# Patient Record
Sex: FEMALE | Race: WHITE
[De-identification: names, ages, dates, MRNs, and addresses within clinical notes are randomized per-mention and may not be internally consistent; named-entity substitution may affect disease eponyms.]

---
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
## 2018-02-07 NOTE — EDM.PDOC
ED HPI GENERAL MEDICAL PROBLEM





- General


Chief Complaint: Lower Extremity Injury/Pain


Stated Complaint: R FOOT INJURY


Time Seen by Provider: 18 18:00


Source of Information: Reports: Patient, RN


History Limitations: Reports: No Limitations





- History of Present Illness


INITIAL COMMENTS - FREE TEXT/NARRATIVE: 





33 yo female fell on some stairs this morning and injured her R foot. Is barely 

able to bear weight on this foot. Has bruising dorsally. No other areas of pain.


Onset: Today


Onset Date: 18


Onset Time: 08:30


Duration: Hour(s):


Location: Reports: Lower Extremity, Right


Quality: Reports: Dull, Other (sharp with weight bearing)


Severity: Moderate


Improves with: Reports: Rest


Worsens with: Reports: Movement


Context: Reports: Trauma


Associated Symptoms: Reports: No Other Symptoms


Treatments PTA: Reports: Other (see below) (none)


  ** Right Feet


Pain Score (Numeric/FACES): 4





- Related Data


 Allergies











Allergy/AdvReac Type Severity Reaction Status Date / Time


 


No Known Allergies Allergy   Verified 17 21:48











Home Meds: 


 Home Meds





Diazepam [Valium] 2 mg PO ASDIRECTED PRN 17 [History]


FLUoxetine [PROzac] 20 mg PO DAILY 17 [History]


Levothyroxine 25 mcg PO DAILY 17 [History]











Past Medical History


Respiratory History: Reports: Asthma, Bronchitis, Recurrent


OB/GYN History: Reports: Pregnancy


Musculoskeletal History: Reports: Other (See Below)


Other Musculoskeletal History: C/0 R foot pain


Neurological History: Reports: Concussion


Psychiatric History: Reports: ADHD, Addiction, Anxiety, Panic Attack, Other (

See Below)


Other Psychiatric History: addicted to alcohol and recreational drugs


Endocrine/Metabolic History: Reports: Hyperthyroidism





- Past Surgical History


Female  Surgical History: Reports:  Section





Social & Family History





- Tobacco Use


Smoking Status *Q: Current Every Day Smoker


Years of Tobacco use: 10


Packs/Tins Daily: 5





- Caffeine Use


Caffeine Use: Reports: Coffee





- Recreational Drug Use


Recreational Drug Use: No


Drug Use in Last 12 Months: Yes


Recreational Drug Type: Reports: Marijuana/Hashish


Recreational Drug Use Frequency: Rarely





Review of Systems





- Review of Systems


Review Of Systems: See Below


Constitutional: Reports: No Symptoms


Musculoskeletal: Reports: Foot Pain (right)


Skin: Reports: Bruising (dorsum R foot)


Neurological: Reports: No Symptoms





ED EXAM, GENERAL





- Physical Exam


Exam: See Below


Exam Limited By: No Limitations


General Appearance: Alert, WD/WN, No Apparent Distress


Eye Exam: Bilateral Eye: Normal Inspection


Ears: Normal External Exam, Normal Canal, Hearing Grossly Normal


Ear Exam: Bilateral Ear: Auricle Normal, Canal Normal


Nose: Normal Inspection, Normal Mucosa, No Blood


Throat/Mouth: Normal Voice, No Airway Compromise


Head: Atraumatic, Normocephalic


Neck: Normal Inspection


Respiratory/Chest: No Respiratory Distress, No Accessory Muscle Use


Cardiovascular: Regular Rate, Rhythm, No Edema


Extremities: Other (R foot pain)


Neurological: Alert, Oriented, CN II-XII Intact, Normal Cognition, No Motor/

Sensory Deficits


Psychiatric: Normal Affect, Normal Mood


Skin Exam: Warm, Dry, Intact, Ecchymosis (dorsum of the R foot)





Course





- Vital Signs


Last Recorded V/S: 


 Last Vital Signs











Temp  36.9 C   18 17:45


 


Pulse  70   18 17:45


 


Resp  18   18 17:45


 


BP  126/72   18 17:45


 


Pulse Ox  100   18 17:43














- Orders/Labs/Meds


Orders: 


 Active Orders 24 hr











 Category Date Time Status


 


 Foot Comp Min 3V Rt [CR] Stat Exams  18 18:05 Ordered














- Radiology Interpretation


Free Text/Narrative:: 





R foot X-ray-negative





Departure





- Departure


Time of Disposition: 18:44


Disposition: Home, Self-Care 01


Condition: Good


Clinical Impression: 


Contusion, foot


Qualifiers:


 Encounter type: initial encounter Laterality: right Qualified Code(s): 

S90.31XA - Contusion of right foot, initial encounter








- Discharge Information


Referrals: 


Franklin Henriquez MD [Primary Care Provider] - 


Forms:  ED Department Discharge





- My Orders


Last 24 Hours: 


My Active Orders





18 18:05


Foot Comp Min 3V Rt [CR] Stat 














- Assessment/Plan


Last 24 Hours: 


My Active Orders





18 18:05


Foot Comp Min 3V Rt [CR] Stat

## 2018-02-08 NOTE — CR
No definitive acute fracture. Slight irregularity at the fourth metatarsal neck which may be degenera
tive. There is point tenderness this location recommend x-ray follow-up in 7-10 days.

## 2018-02-21 NOTE — EDM.PDOC
ED HPI GENERAL MEDICAL PROBLEM





- General


Chief Complaint: Neck Problem


Stated Complaint: HEAD/NECK PAIN


Time Seen by Provider: 18 19:05


Source of Information: Reports: Patient, RN Notes Reviewed


History Limitations: Reports: No Limitations





- History of Present Illness


INITIAL COMMENTS - FREE TEXT/NARRATIVE: 





32-year-old female presents to the emergency department today complaint of neck 

pain and jaw pain, she states she's had this on and off for several months 

however over the last couple days it has progressively gotten worse. Denies any 

fevers no particular trauma does admit to having poor dentition and she is 

working with the dentist.


  ** Head


Pain Score (Numeric/FACES): 6





- Related Data


 Allergies











Allergy/AdvReac Type Severity Reaction Status Date / Time


 


No Known Allergies Allergy   Verified 17 21:48











Home Meds: 


 Home Meds





Diazepam [Valium] 2 mg PO ASDIRECTED PRN 17 [History]


FLUoxetine [PROzac] 20 mg PO DAILY 17 [History]


Levothyroxine 25 mcg PO DAILY 17 [History]











Past Medical History


HEENT History: Reports: Other (See Below)


Other HEENT History: TMJ


Respiratory History: Reports: Asthma, Bronchitis, Recurrent


OB/GYN History: Reports: Pregnancy


Musculoskeletal History: Reports: Other (See Below)


Other Musculoskeletal History: C/0 R foot paintorn tendon R ankle


Neurological History: Reports: Concussion


Psychiatric History: Reports: ADHD, Addiction, Anxiety, Panic Attack, Other (

See Below)


Other Psychiatric History: addicted to alcohol and recreational drugs


Endocrine/Metabolic History: Reports: Hyperthyroidism





- Past Surgical History


Female  Surgical History: Reports:  Section





Social & Family History





- Tobacco Use


Smoking Status *Q: Current Every Day Smoker


Years of Tobacco use: 16


Packs/Tins Daily: 0.5





- Caffeine Use


Caffeine Use: Reports: Coffee, Soda





- Alcohol Use


Days Per Week of Alcohol Use: 1


Number of Drinks Per Day: 4


Total Drinks Per Week: 4


Date of Last Drink: 18


Time of Last Drink: 17:00





- Recreational Drug Use


Recreational Drug Use: Yes


Drug Use in Last 12 Months: No


Recreational Drug Type: Reports: Methamphetamine


Recreational Drug Use Frequency: Rarely





ED ROS ENT





- Review of Systems


Review Of Systems: See Below


Constitutional: Denies: Fever, Chills


HEENT: Reports: Dental Pain


Respiratory: Reports: No Symptoms


Cardiovascular: Reports: No Symptoms


GI/Abdominal: Reports: No Symptoms


: Reports: No Symptoms


Musculoskeletal: Reports: Neck Pain


Skin: Reports: No Symptoms


Neurological: Reports: No Symptoms





ED EXAM, ENT





- Physical Exam


Exam: See Below


Text/Narrative:: 





General: Female mild distress secondary to pain, tearful, anxious, alert and 

oriented x3 HEENT: head is atraumatic normocephalic, eyes pupils equal round 

reactive to light, sclera clear no conjunctivitis appreciated.  Ears tympanic 

membranes clear and gray landmarks and light reflex are present bilaterally 

canals are clear.  Nose no septal deviation, nares are clear, no blood present.

  Mouth mucosa is moist and pink no erythema or exudate noted in soft palate, 

tongue is midline uvula is midline, dentition is poor, multiple missing teeth 

multiple fractured teeth tenderness to palpation right side lower jaw.


Neck: Supple no thyromegaly no tracheal deviation. I cannot appreciate any 

tenderness to palpation full range of motion


Nodes: Cervical nodes subclavicular nodes nontender no palpable lymphadenopathy 

noted.


Lungs: clear to auscultation bilaterally with symmetrical respirations, no 

adventitious noise appreciated.


CV: Regular rate and rhythm S1 and S2 appreciated no murmurs rubs or gallops 

noted.


Abdomen: Soft, nontender, no palpable masses or organomegaly appreciated, no 

distention no guarding bowel sounds are present, [scars ] .


Neuro: Cranial nerves II through XII grossly intact


 





Course





- Vital Signs


Last Recorded V/S: 


 Last Vital Signs











Temp  98.2 F   18 18:30


 


Pulse  120 H  18 18:30


 


Resp  18   18 18:30


 


BP  132/32 L  18 18:30


 


Pulse Ox  100   18 18:30














- Orders/Labs/Meds


Labs: 


 Laboratory Tests











  18 Range/Units





  19:11 


 


WBC  11.2 H  (4.5-11.0)  K/uL


 


RBC  4.24  (3.30-5.50)  M/uL


 


Hgb  13.3  (12.0-15.0)  g/dL


 


Hct  40.3  (36.0-48.0)  %


 


MCV  95  (80-98)  fL


 


MCH  31  (27-31)  pg


 


MCHC  33  (32-36)  %


 


Plt Count  305  (150-400)  K/uL


 


Neut % (Auto)  59  (36-66)  %


 


Lymph % (Auto)  29  (24-44)  %


 


Mono % (Auto)  5  (2-6)  %


 


Eos % (Auto)  6 H  (2-4)  %


 


Baso % (Auto)  1  (0-1)  %











Meds: 


Medications














Discontinued Medications














Generic Name Dose Route Start Last Admin





  Trade Name Mellisa  PRN Reason Stop Dose Admin


 


Ketorolac Tromethamine  60 mg  18 19:11  18 19:19





  Toradol  IM  18 19:12  60 mg





  ONETIME ONE   Administration














Departure





- Departure


Time of Disposition: 20:27


Disposition: Home, Self-Care 01


Condition: Good


Clinical Impression: 


 Pain, dental








- Discharge Information


Referrals: 


Franklin Henriquez MD [Primary Care Provider] - 


Forms:  ED Department Discharge


Additional Instructions: 


Continue to use the Toradol as needed for pain control, please contact your 

dentist in the morning for possible sooner appointment, call return to the 

emergency department with worsening of symptoms





- Assessment/Plan


Plan: 





Assessment





Acuity = acute





Site and laterality = dental pain  





Etiology  = underlying caries and dental trauma





Manifestations = none





Location of injury =  Home





Lab values = CBC within normal limits





Plan


She had significant relief with the Toradol injection provided, she is 

established with the community dental program, she is to call in the morning 

for a possible sooner appointment, prescription written for Toradol 10 mg by 

mouth 3 times a day when necessary total #20 tablets

















 This note was dictated using dragon voice recognition software please call 

with any questions on syntax or cathleen.

## 2020-07-24 ENCOUNTER — HOSPITAL ENCOUNTER (EMERGENCY)
Dept: HOSPITAL 11 - JP.ED | Age: 35
Discharge: LEFT BEFORE DISCHARGE | End: 2020-07-24
Payer: MEDICAID

## 2020-07-24 VITALS — HEART RATE: 64 BPM | DIASTOLIC BLOOD PRESSURE: 93 MMHG | SYSTOLIC BLOOD PRESSURE: 142 MMHG

## 2020-07-24 DIAGNOSIS — Z53.21: Primary | ICD-10-CM

## 2021-01-29 ENCOUNTER — HOSPITAL ENCOUNTER (EMERGENCY)
Dept: HOSPITAL 11 - JP.ED | Age: 36
Discharge: HOME | End: 2021-01-29
Payer: MEDICAID

## 2021-01-29 VITALS — HEART RATE: 124 BPM | SYSTOLIC BLOOD PRESSURE: 130 MMHG | DIASTOLIC BLOOD PRESSURE: 80 MMHG

## 2021-01-29 DIAGNOSIS — Z79.899: ICD-10-CM

## 2021-01-29 DIAGNOSIS — Z72.0: ICD-10-CM

## 2021-01-29 DIAGNOSIS — E03.9: ICD-10-CM

## 2021-01-29 DIAGNOSIS — J45.909: ICD-10-CM

## 2021-01-29 DIAGNOSIS — R55: Primary | ICD-10-CM

## 2021-01-29 NOTE — EDM.PDOC
ED HPI GENERAL MEDICAL PROBLEM





- General


Chief Complaint: Drug or Alcohol Abuse


Stated Complaint: EVAL


Time Seen by Provider: 21 22:01


Source of Information: Reports: Patient


History Limitations: Reports: No Limitations





- History of Present Illness


INITIAL COMMENTS - FREE TEXT/NARRATIVE: 


Criselda is a 35-year-old female presenting to the ED for evaluation of 

episodes of tachycardia, diaphoresis, lightheadedness, and near syncope.  She 

reports that she has these episodes about twice a year and has had them for the 

last 18 years.  She initially thought that she was having episodes of 

hypoglycemia but has had her blood sugar checked checked and it was normal.  The

reason she is here tonight is because she reported to [facility] for her weekend 

incarceration but they refused to take her because she is under the influence of

an unknown substance.  She presents with staggering gait and some slurring of 

her words.  She denies any drug or alcohol use.  She is requesting that we check

her TSH as she was supposed to have this done recently.  She has had evaluation 

of this in the past and they have been unable to find a cause for her symptoms. 

She reports her last episode was over a year ago.








- Related Data


                                    Allergies











Allergy/AdvReac Type Severity Reaction Status Date / Time


 


No Known Allergies Allergy   Verified 21 21:56











Home Meds: 


                                    Home Meds





Levothyroxine 25 mcg PO DAILY 17 [History]


Escitalopram [Lexapro] 20 mg PO DAILY 20 [History]


atoMOXetine HCl [Atomoxetine HCl] 10 mg PO DAILY 20 [History]


Diclofenac Sodium [Voltaren] 75 mg PO DAILY 21 [History]


Doxepin HCl [Doxepin] 10 mg PO DAILY 21 [History]


QUEtiapine [SEROquel] 25 mg PO BEDTIME 21 [History]


traZODone HCl [Trazodone HCl] 50 mg PO BEDTIME 21 [History]











Past Medical History


HEENT History: Reports: Other (See Below)


Other HEENT History: TMJ


Respiratory History: Reports: Asthma, Bronchitis, Recurrent


OB/GYN History: Reports: Pregnancy


Musculoskeletal History: Reports: Other (See Below)


Other Musculoskeletal History: C/0 R foot paintorn tendon R ankle


Neurological History: Reports: Concussion


Psychiatric History: Reports: ADHD, Addiction, Anxiety, Panic Attack, Other (See

 Below)


Other Psychiatric History: addicted to alcohol and recreational drugs


Endocrine/Metabolic History: Reports: Hyperthyroidism





- Infectious Disease History


Infectious Disease History: Reports: Novel Coronavirus





- Past Surgical History


Female  Surgical History: Reports:  Section





Social & Family History





- Tobacco Use


Tobacco Use Status *Q: Current Every Day Tobacco User


Years of Tobacco use: 20


Packs/Tins Daily: 0.2





- Caffeine Use


Caffeine Use: Reports: Coffee


Caffeine Use Comment: 2 pots per day





- Recreational Drug Use


Recreational Drug Use: No





ED ROS GENERAL





- Review of Systems


Review Of Systems: See Below


Constitutional: Reports: Diaphoresis (When she has these episodes but has not 

had an episode in over a year.)


HEENT: Reports: Vision Change (Blurred vision only with the episodes)


Respiratory: Reports: No Symptoms


Cardiovascular: Reports: Palpitations (Tachycardia with episodes)


Endocrine: Reports: Fatigue


GI/Abdominal: Reports: No Symptoms


: Reports: No Symptoms


Musculoskeletal: Reports: No Symptoms


Skin: Reports: No Symptoms


Neurological: Reports: Syncope (Only with the episodes)


Psychiatric: Reports: No Symptoms


Hematologic/Lymphatic: Reports: No Symptoms


Immunologic: Reports: No Symptoms





ED EXAM, GENERAL





- Physical Exam


Exam: See Below


Exam Limited By: No Limitations


General Appearance: Alert, No Apparent Distress


Eye Exam: Bilateral Eye: EOMI, PERRL


Throat/Mouth: Normal Inspection, Normal Lips, Normal Oropharynx, No Airway 

Compromise, Other (Poor dentition)


Head: Atraumatic, Normocephalic


Neck: Normal Inspection, Supple, Non-Tender


Respiratory/Chest: No Respiratory Distress, Lungs Clear, Normal Breath Sounds


Cardiovascular: Normal Peripheral Pulses, Regular Rate, Rhythm, No Murmur


Peripheral Pulses: 2+: Radial (L), Radial (R)


GI/Abdominal: Normal Bowel Sounds, Soft, Non-Tender


Back Exam: Normal Inspection, Full Range of Motion


Extremities: Normal Inspection, Normal Range of Motion


Neurological: Alert, Oriented, Normal Cognition, No Motor/Sensory Deficits, 

Other (Little slurring of the words and a little bit of ataxia with the gait)


Psychiatric: Flat Affect


Skin Exam: Warm, Dry


Lymphatic: No Adenopathy





Course





- Vital Signs


Last Recorded V/S: 


                                Last Vital Signs











Temp  36.6 C   21 21:54


 


Pulse  124 H  21 21:54


 


Resp  16   21 21:54


 


BP  130/80   21 21:54


 


Pulse Ox  100   21 21:54














- Orders/Labs/Meds


Labs: 


                                Laboratory Tests











  21 Range/Units





  22:02 22:12 22:15 


 


WBC   15.0 H   (4.5-11.0)  K/uL


 


RBC   4.48   (3.30-5.50)  M/uL


 


Hgb   14.1   (12.0-15.0)  g/dL


 


Hct   41.8   (36.0-48.0)  %


 


MCV   93   (80-98)  fL


 


MCH   32 H   (27-31)  pg


 


MCHC   34   (32-36)  %


 


Plt Count   334   (150-400)  K/uL


 


Neut % (Auto)   79 H   (36-66)  %


 


Lymph % (Auto)   11 L   (24-44)  %


 


Mono % (Auto)   7 H   (2-6)  %


 


Eos % (Auto)   2   (2-4)  %


 


Baso % (Auto)   1   (0-1)  %


 


Sodium    139 L  (140-148)  mmol/L


 


Potassium    3.7  (3.6-5.2)  mmol/L


 


Chloride    100  (100-108)  mmol/L


 


Carbon Dioxide    28  (21-32)  mmol/L


 


Anion Gap    14.7 H  (5.0-14.0)  mmol/L


 


BUN    12  (7-18)  mg/dL


 


Creatinine    0.9  (0.6-1.0)  mg/dL


 


Est Cr Clr Drug Dosing    78.51  mL/min


 


Estimated GFR (MDRD)    > 60  (>60)  


 


Glucose    106  ()  mg/dL


 


Calcium    8.9  (8.5-10.1)  mg/dL


 


Total Bilirubin    0.2  (0.2-1.0)  mg/dL


 


AST    20  (15-37)  U/L


 


ALT    32  (12-78)  U/L


 


Alkaline Phosphatase    107  ()  U/L


 


Total Protein    7.4  (6.4-8.2)  g/dL


 


Albumin    3.8  (3.4-5.0)  g/dL


 


Globulin    3.6 H  (2.3-3.5)  g/dL


 


Albumin/Globulin Ratio    1.1 L  (1.2-2.2)  


 


TSH, Ultra Sensitive     (0.358-3.740)  uIU/mL


 


Urine Opiates Screen  Negative    (NEGATIVE)  


 


Ur Oxycodone Screen  Negative    (NEGATIVE)  


 


Urine Methadone Screen  Negative    (NEGATIVE)  


 


Ur Propoxyphene Screen  Negative    (NEGATIVE)  


 


Ur Barbiturates Screen  Negative    (NEGATIVE)  


 


Ur Tricyclics Screen  Presumptive positive H    (NEGATIVE)  


 


Ur Phencyclidine Scrn  Negative    (NEGATIVE)  


 


Ur Amphetamine Screen  Negative    (NEGATIVE)  


 


U Methamphetamines Scrn  Negative    (NEGATIVE)  


 


Urine MDMA Screen  Negative    (NEGATIVE)  


 


U Benzodiazepines Scrn  Negative    (NEGATIVE)  


 


U Cocaine Metab Screen  Negative    (NEGATIVE)  


 


U Marijuana (THC) Screen  Negative    (NEGATIVE)  


 


Ethyl Alcohol     mg/dL














  21 Range/Units





  22:15 22:15 


 


WBC    (4.5-11.0)  K/uL


 


RBC    (3.30-5.50)  M/uL


 


Hgb    (12.0-15.0)  g/dL


 


Hct    (36.0-48.0)  %


 


MCV    (80-98)  fL


 


MCH    (27-31)  pg


 


MCHC    (32-36)  %


 


Plt Count    (150-400)  K/uL


 


Neut % (Auto)    (36-66)  %


 


Lymph % (Auto)    (24-44)  %


 


Mono % (Auto)    (2-6)  %


 


Eos % (Auto)    (2-4)  %


 


Baso % (Auto)    (0-1)  %


 


Sodium    (140-148)  mmol/L


 


Potassium    (3.6-5.2)  mmol/L


 


Chloride    (100-108)  mmol/L


 


Carbon Dioxide    (21-32)  mmol/L


 


Anion Gap    (5.0-14.0)  mmol/L


 


BUN    (7-18)  mg/dL


 


Creatinine    (0.6-1.0)  mg/dL


 


Est Cr Clr Drug Dosing    mL/min


 


Estimated GFR (MDRD)    (>60)  


 


Glucose    ()  mg/dL


 


Calcium    (8.5-10.1)  mg/dL


 


Total Bilirubin    (0.2-1.0)  mg/dL


 


AST    (15-37)  U/L


 


ALT    (12-78)  U/L


 


Alkaline Phosphatase    ()  U/L


 


Total Protein    (6.4-8.2)  g/dL


 


Albumin    (3.4-5.0)  g/dL


 


Globulin    (2.3-3.5)  g/dL


 


Albumin/Globulin Ratio    (1.2-2.2)  


 


TSH, Ultra Sensitive   5.322 H  (0.358-3.740)  uIU/mL


 


Urine Opiates Screen    (NEGATIVE)  


 


Ur Oxycodone Screen    (NEGATIVE)  


 


Urine Methadone Screen    (NEGATIVE)  


 


Ur Propoxyphene Screen    (NEGATIVE)  


 


Ur Barbiturates Screen    (NEGATIVE)  


 


Ur Tricyclics Screen    (NEGATIVE)  


 


Ur Phencyclidine Scrn    (NEGATIVE)  


 


Ur Amphetamine Screen    (NEGATIVE)  


 


U Methamphetamines Scrn    (NEGATIVE)  


 


Urine MDMA Screen    (NEGATIVE)  


 


U Benzodiazepines Scrn    (NEGATIVE)  


 


U Cocaine Metab Screen    (NEGATIVE)  


 


U Marijuana (THC) Screen    (NEGATIVE)  


 


Ethyl Alcohol  < 3   mg/dL














- Re-Assessments/Exams


Free Text/Narrative Re-Assessment/Exam: 





21 23:17 I reviewed the patient's labs which show a leukocytosis of 15.0 

with a normal differential.  This is likely demargination.  The patient has no 

focal areas to suggest that infection at this time.  She does have a significant

 elevation of her TSH at 5.32 suggesting that she is underdosed on her levot

hyroxine at 25 mcg daily.  I will encourage her to increase this to 2 tabs daily

 or 50 mcg.  She should follow-up with her primary care provider in short order 

to continue managing this.  Her episodes seem to be consistent with a vasovagal 

syncopal or near syncopal episode.  There is nothing in her work-up today to 

suggest any other type of cause.  At this time she is suitable for discharge 

home in satisfactory condition.








Departure





- Departure


Time of Disposition: 23:18


Disposition: Home, Self-Care 01


Condition: Good


Clinical Impression: 


 Near syncope





Hypothyroidism


Qualifiers:


 Hypothyroidism type: unspecified Qualified Code(s): E03.9 - Hypothyroidism, 

unspecified








- Discharge Information


*PRESCRIPTION DRUG MONITORING PROGRAM REVIEWED*: Not Applicable


*COPY OF PRESCRIPTION DRUG MONITORING REPORT IN PATIENT GEORGE: Not Applicable


Instructions:  Hypothyroidism, Near-Syncope, Easy-to-Read


Referrals: 


Franklin Henriquez MD [Primary Care Provider] - 


Forms:  ED Department Discharge


Care Plan Goals: 


I would recommend increasing your thyroxine to 50 mcg daily (2 tablets a day).  

You should follow-up with your primary care provider in the next week or 2 for a

recheck of your TSH as you may need to have it adjusted further upward.  As for 

your intermittent episodes, these are likely near syncopal episodes and may be 

related to a sudden drop in blood pressure.  Certainly could be related to her 

thyroid but there are other causes that are more common.  At this time I do not 

see thing that is of worrisome in your work-up.





Sepsis Event Note (ED)





- Evaluation


Sepsis Screening Result: No Definite Risk





- Focused Exam


Vital Signs: 


                                   Vital Signs











  Temp Pulse Resp BP Pulse Ox


 


 21 21:54  36.6 C  124 H  16  130/80  100














- Problem List & Annotations


(1) Hypothyroidism


SNOMED Code(s): 80641614


   Code(s): E03.9 - HYPOTHYROIDISM, UNSPECIFIED   Status: Acute   Priority: High

  Current Visit: Yes   


Qualifiers: 


   Hypothyroidism type: unspecified   Qualified Code(s): E03.9 - Hypothyroidism,

unspecified   





(2) Near syncope


SNOMED Code(s): 581115459


   Code(s): R55 - SYNCOPE AND COLLAPSE   Status: Acute   Priority: High   

Current Visit: Yes   





- Problem List Review


Problem List Initiated/Reviewed/Updated: Yes

## 2022-01-30 ENCOUNTER — HOSPITAL ENCOUNTER (EMERGENCY)
Dept: HOSPITAL 11 - JP.ED | Age: 37
Discharge: HOME | End: 2022-01-30
Payer: COMMERCIAL

## 2022-01-30 VITALS — HEART RATE: 99 BPM | SYSTOLIC BLOOD PRESSURE: 117 MMHG | DIASTOLIC BLOOD PRESSURE: 84 MMHG

## 2022-01-30 DIAGNOSIS — Z79.899: ICD-10-CM

## 2022-01-30 DIAGNOSIS — B34.9: Primary | ICD-10-CM

## 2022-01-30 DIAGNOSIS — Z72.0: ICD-10-CM

## 2022-06-20 ENCOUNTER — HOSPITAL ENCOUNTER (EMERGENCY)
Dept: HOSPITAL 11 - JP.ED | Age: 37
Discharge: HOME | End: 2022-06-20
Payer: COMMERCIAL

## 2022-06-20 VITALS — HEART RATE: 107 BPM

## 2022-06-20 VITALS — DIASTOLIC BLOOD PRESSURE: 89 MMHG | SYSTOLIC BLOOD PRESSURE: 131 MMHG

## 2022-06-20 DIAGNOSIS — Z72.0: ICD-10-CM
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
## 2022-06-20 DIAGNOSIS — S91.311A: Primary | ICD-10-CM
